# Patient Record
Sex: FEMALE | Race: WHITE | NOT HISPANIC OR LATINO | Employment: OTHER | URBAN - METROPOLITAN AREA
[De-identification: names, ages, dates, MRNs, and addresses within clinical notes are randomized per-mention and may not be internally consistent; named-entity substitution may affect disease eponyms.]

---

## 2023-08-25 ENCOUNTER — OFFICE VISIT (OUTPATIENT)
Dept: OBGYN CLINIC | Facility: CLINIC | Age: 73
End: 2023-08-25
Payer: MEDICARE

## 2023-08-25 VITALS
DIASTOLIC BLOOD PRESSURE: 60 MMHG | HEIGHT: 61 IN | SYSTOLIC BLOOD PRESSURE: 128 MMHG | BODY MASS INDEX: 28.32 KG/M2 | WEIGHT: 150 LBS

## 2023-08-25 DIAGNOSIS — N89.8 VAGINAL DISCHARGE: ICD-10-CM

## 2023-08-25 DIAGNOSIS — R93.5 ABNORMAL ULTRASOUND OF ENDOMETRIUM: Primary | ICD-10-CM

## 2023-08-25 PROCEDURE — 58100 BIOPSY OF UTERUS LINING: CPT | Performed by: OBSTETRICS & GYNECOLOGY

## 2023-08-25 PROCEDURE — 99203 OFFICE O/P NEW LOW 30 MIN: CPT | Performed by: OBSTETRICS & GYNECOLOGY

## 2023-08-25 PROCEDURE — 88305 TISSUE EXAM BY PATHOLOGIST: CPT | Performed by: STUDENT IN AN ORGANIZED HEALTH CARE EDUCATION/TRAINING PROGRAM

## 2023-08-25 PROCEDURE — 88344 IMHCHEM/IMCYTCHM EA MLT ANTB: CPT | Performed by: STUDENT IN AN ORGANIZED HEALTH CARE EDUCATION/TRAINING PROGRAM

## 2023-08-25 RX ORDER — ATORVASTATIN CALCIUM 10 MG/1
10 TABLET, FILM COATED ORAL DAILY
COMMUNITY
Start: 2023-06-09

## 2023-08-25 NOTE — PROGRESS NOTES
[address]  [facility], [address], [place], [address]    Assessment/Plan:  1. Abnormal ultrasound of endometrium  Comments:  Pelvic ultrasound endometrium 12mm with simple appearing fluid  Assessment & Plan:  Reviewed ultrasound with patient which shows normal uterus with fluid in endometrial canal.  Discussed prior ultrasound from 2012 which had similar findings and reminded her of hysteroscopy. Discussed it's possible this is just fluid trapped in the uterus due to some stenosis, but cannot rule out malignancy without sampling. She denies any pain or bleeding. She agrees to attempt at endometrial biopsy today. Endometrial biopsy performed with dilation. No blood noted but clear mucous in pipelle. This will be sent. Reviewed if this is benign would not recommend further evaluation unless other symptoms develop. She expresses understanding. Will communicate results then they are available. Orders:  -     Tissue Exam  -     Endometrial biopsy    2. Vaginal discharge  Comments:  Exam without discharge noted in vagina. I have spent a total time of 35 minutes on 23 in caring for this patient including Prognosis, Risks and benefits of tx options, Instructions for management, Impressions, Counseling / Coordination of care, Documenting in the medical record, Reviewing / ordering tests, medicine, procedures   and Obtaining or reviewing history  . Subjective:   Nancylee Boast is a 74 y.o.  female. CC: endometrial lining is thickened      HPI:   Patient last seen at our office in 2016. Returns with c/o thicker vaginal discharge, intermittenly over the past year. Thickened endometrium on Pelvic US ordered by Dr. Stephen Rodriguez. She describes discharge as clear but thicker than water (doesn't absorb into pad immediately). No odor. Happens daily but not specifically a time of day. Feels when it happens. It is not urine (which soaks into pad immediately).   No bleeding, no pain. Not sexually active. Some urinary urgency, occasional incontinence but feels this discharge is not urine. 2023 uterus 7.0 x 2.4 x 4.0cm, endometrium 12mm with simple appearing fluid, small focus in endocervical canal could be hemorrhage or vascular polyp, right ovary normal, left ovary not seen. Review of previous records in our office show - In 2012 patient had fluid noted on ultrasound as well and cervical stenosis in office. Hysteroscopy was done in OR which was normal and fluid benign. Neris Jordon doesn't recall this. Gyn History  No LMP recorded. Patient is postmenopausal.       She  reports that she is not currently sexually active. She reports using the following method of birth control/protection: Post-menopausal.       OB History      Past Medical History:  No date: Colon polyp  No date: Diverticulosis  No date: High cholesterol  2018: History of shingles     Past Surgical History:  1968: APPENDECTOMY  No date:  SECTION      Comment:  x3  1969: CHOLECYSTECTOMY  2012: HYSTEROSCOPY      Comment:  done for fluid noted on ultrasound and cervical stenosis               in office - hysteroscopy normal and fluid benign -                performed by Dr. Prema Ty History     Tobacco Use   • Smoking status: Every Day     Packs/day: 0.50     Years: 60.00     Total pack years: 30.00     Types: Cigarettes   • Smokeless tobacco: Never   Vaping Use   • Vaping Use: Never used   Substance Use Topics   • Alcohol use: Not Currently   • Drug use: Never          Current Outpatient Medications:   •  atorvastatin (LIPITOR) 10 mg tablet, Take 10 mg by mouth daily, Disp: , Rfl:     She has No Known Allergies. .    ROS: Review of Systems   Constitutional: Negative. Gastrointestinal: Negative. Genitourinary: Positive for vaginal discharge. Negative for pelvic pain and vaginal bleeding. Psychiatric/Behavioral: Negative.         Objective:  /60   Ht 5' 1.25" (1.556 m)   Wt 68 kg (150 lb)   BMI 28.11 kg/m²      Physical Exam  Constitutional:       Appearance: Normal appearance. Genitourinary:     General: Normal vulva. Vagina: Normal. No vaginal discharge, tenderness, bleeding or lesions. Cervix: No lesion or cervical bleeding. Uterus: Normal. Not enlarged and not tender. Adnexa:         Right: No mass or tenderness. Left: No mass or tenderness. Rectum: No external hemorrhoid. Comments: Atrophic changes consistent with age  Neurological:      Mental Status: She is alert. Psychiatric:         Mood and Affect: Mood normal.         Behavior: Behavior normal.           Endometrial biopsy    Date/Time: 8/25/2023 10:30 AM    Performed by: Kofi Damon MD  Authorized by: Kofi Damon MD  Universal Protocol:  Consent: Verbal consent obtained.   Risks and benefits: risks, benefits and alternatives were discussed  Consent given by: patient  Patient understanding: patient states understanding of the procedure being performed  Patient identity confirmed: verbally with patient      Procedure:     Procedure: endometrial biopsy with Pipelle      A bivalve speculum was placed in the vagina: yes      Cervix cleaned and prepped: yes      A paracervical block was performed: no      The cervix was dilated: yes      Uterus sounded: yes      Uterus sound depth (cm):  7    Specimen collected: specimen collected and sent to pathology      Specimen collected comment:  Clear mucous collected, no blood    Patient tolerated procedure well with no complications: yes

## 2023-08-25 NOTE — PATIENT INSTRUCTIONS
- expect spotting or light bleeding for a few days following biopsy   - Motrin 600mg every 6 hours or Tylenol 650mg every 6-8 hours for cramping or pain   - nothing in your vagina until bleeding completely stops or at least 2-3 days   - call office if fever, severe pelvic pain or foul vaginal odor

## 2023-08-25 NOTE — ASSESSMENT & PLAN NOTE
Reviewed ultrasound with patient which shows normal uterus with fluid in endometrial canal.  Discussed prior ultrasound from 2012 which had similar findings and reminded her of hysteroscopy. Discussed it's possible this is just fluid trapped in the uterus due to some stenosis, but cannot rule out malignancy without sampling. She denies any pain or bleeding. She agrees to attempt at endometrial biopsy today. Endometrial biopsy performed with dilation. No blood noted but clear mucous in pipelle. This will be sent. Reviewed if this is benign would not recommend further evaluation unless other symptoms develop. She expresses understanding. Will communicate results then they are available.

## 2023-08-31 PROCEDURE — 88305 TISSUE EXAM BY PATHOLOGIST: CPT | Performed by: STUDENT IN AN ORGANIZED HEALTH CARE EDUCATION/TRAINING PROGRAM

## 2023-08-31 PROCEDURE — 88344 IMHCHEM/IMCYTCHM EA MLT ANTB: CPT | Performed by: STUDENT IN AN ORGANIZED HEALTH CARE EDUCATION/TRAINING PROGRAM

## 2023-08-31 NOTE — LETTER
September 6, 2023    [facility], [facility]  [address]    Patient: Jose Elias Brown   YOB: 1950   Date of Visit: 8/31/2023       Dear Dr. John Perry,    I saw To Jones and performed an endometrial biopsy to follow up the fluid noted on ultrasound. I think she had fluid trapped and that was causing her discharge. The biopsy was benign, I've attached results here. If she is not having any bleeding no further evaluation necessary at this time. I'm hoping discharge will decrease since I removed fluid with biopsy.         Sincerely,      Nicole Aldridge MD        CC: No Recipients